# Patient Record
Sex: FEMALE | Race: WHITE | NOT HISPANIC OR LATINO
[De-identification: names, ages, dates, MRNs, and addresses within clinical notes are randomized per-mention and may not be internally consistent; named-entity substitution may affect disease eponyms.]

---

## 2020-10-28 ENCOUNTER — APPOINTMENT (OUTPATIENT)
Dept: OBGYN | Facility: CLINIC | Age: 30
End: 2020-10-28

## 2020-11-18 ENCOUNTER — APPOINTMENT (OUTPATIENT)
Dept: OBGYN | Facility: CLINIC | Age: 30
End: 2020-11-18
Payer: COMMERCIAL

## 2020-11-18 VITALS
BODY MASS INDEX: 24.11 KG/M2 | WEIGHT: 150 LBS | DIASTOLIC BLOOD PRESSURE: 70 MMHG | SYSTOLIC BLOOD PRESSURE: 105 MMHG | HEIGHT: 66 IN

## 2020-11-18 DIAGNOSIS — D25.2 SUBSEROSAL LEIOMYOMA OF UTERUS: ICD-10-CM

## 2020-11-18 DIAGNOSIS — Z01.419 ENCOUNTER FOR GYNECOLOGICAL EXAMINATION (GENERAL) (ROUTINE) W/OUT ABNORMAL FINDINGS: ICD-10-CM

## 2020-11-18 PROCEDURE — 99213 OFFICE O/P EST LOW 20 MIN: CPT | Mod: 25

## 2020-11-18 PROCEDURE — 99385 PREV VISIT NEW AGE 18-39: CPT

## 2020-11-18 NOTE — HISTORY OF PRESENT ILLNESS
[FreeTextEntry1] : This is a 30-year-old white female G0 who presents as a new patient after a multiyear absence.  she is sexually active with her  and considering pregnancy.  She recently had a pelvic sonogram that reveals a 8.4 x 5.5 cm uterus with normal adnexa except for 1.9 cm subserosal fibroid in the posterior body portion of the uterus. Patient is concerned about fibroids she is considering pregnancy. She does not report heavy menses her pain.\par \par She has no significant medical surgical history. She is G0. She is . She denies alcohol tobacco or drug use. She denies any family history of breast, ovarian, colon, uterine CA. She has not had gardasil.\par \par Patient works for a [employer].

## 2020-11-18 NOTE — DISCUSSION/SUMMARY
[FreeTextEntry1] : A Pap smear is performed.\par \par Regarding fibroids and extensive discussion. Using picture is I. advised the patient that fibroids are extremely, and then up to 50% of patients developed fibroids. I discussed that having fibroids although does not warrant therapy in the absence of heavy bleeding or pain. I also discussed the different locations of fibroids including submucosal, intramural versus subserosal; versus subserosal and it did advise her that in general these are that is symptomatic. For now I reassured her that although I cannot guarantee it would not interfere with pregnancy it is unlikely to do so.\par \par I also advised prenatal vitamins

## 2020-11-24 LAB — CYTOLOGY CVX/VAG DOC THIN PREP: NORMAL

## 2020-12-23 PROBLEM — Z01.419 ENCOUNTER FOR ROUTINE GYNECOLOGICAL EXAMINATION WITH PAPANICOLAOU SMEAR OF CERVIX: Status: RESOLVED | Noted: 2020-10-28 | Resolved: 2020-12-23
